# Patient Record
Sex: FEMALE | Race: WHITE | URBAN - METROPOLITAN AREA
[De-identification: names, ages, dates, MRNs, and addresses within clinical notes are randomized per-mention and may not be internally consistent; named-entity substitution may affect disease eponyms.]

---

## 2018-06-01 ENCOUNTER — TRANSFERRED RECORDS (OUTPATIENT)
Dept: HEALTH INFORMATION MANAGEMENT | Facility: CLINIC | Age: 43
End: 2018-06-01

## 2018-06-01 LAB — PAP SMEAR - HIM PATIENT REPORTED: NEGATIVE

## 2019-11-12 ENCOUNTER — OFFICE VISIT (OUTPATIENT)
Dept: FAMILY MEDICINE | Facility: CLINIC | Age: 44
End: 2019-11-12
Payer: COMMERCIAL

## 2019-11-12 VITALS
BODY MASS INDEX: 41.37 KG/M2 | OXYGEN SATURATION: 96 % | HEIGHT: 66 IN | SYSTOLIC BLOOD PRESSURE: 132 MMHG | RESPIRATION RATE: 16 BRPM | DIASTOLIC BLOOD PRESSURE: 89 MMHG | HEART RATE: 88 BPM | TEMPERATURE: 97.4 F | WEIGHT: 257.4 LBS

## 2019-11-12 DIAGNOSIS — E66.01 MORBID OBESITY (H): ICD-10-CM

## 2019-11-12 DIAGNOSIS — Z86.2 HISTORY OF ANEMIA: ICD-10-CM

## 2019-11-12 DIAGNOSIS — Z83.3 FAMILY HISTORY OF DIABETES MELLITUS: ICD-10-CM

## 2019-11-12 DIAGNOSIS — E03.9 ACQUIRED HYPOTHYROIDISM: ICD-10-CM

## 2019-11-12 DIAGNOSIS — I95.1 ORTHOSTATIC HYPOTENSION: ICD-10-CM

## 2019-11-12 DIAGNOSIS — A09 TRAVELER'S DIARRHEA: ICD-10-CM

## 2019-11-12 DIAGNOSIS — G25.81 RESTLESS LEG SYNDROME: ICD-10-CM

## 2019-11-12 DIAGNOSIS — B37.31 YEAST INFECTION OF THE VAGINA: ICD-10-CM

## 2019-11-12 DIAGNOSIS — Z00.00 ROUTINE GENERAL MEDICAL EXAMINATION AT A HEALTH CARE FACILITY: Primary | ICD-10-CM

## 2019-11-12 DIAGNOSIS — M67.432 GANGLION CYST OF WRIST, LEFT: ICD-10-CM

## 2019-11-12 LAB
CHOLEST SERPL-MCNC: 218 MG/DL
DEPRECATED CALCIDIOL+CALCIFEROL SERPL-MC: 36 UG/L (ref 20–75)
ERYTHROCYTE [DISTWIDTH] IN BLOOD BY AUTOMATED COUNT: 14.9 % (ref 10–15)
FERRITIN SERPL-MCNC: 21 NG/ML (ref 12–150)
HBA1C MFR BLD: 5.7 % (ref 0–5.6)
HCT VFR BLD AUTO: 39.2 % (ref 35–47)
HDLC SERPL-MCNC: 40 MG/DL
HGB BLD-MCNC: 12.5 G/DL (ref 11.7–15.7)
LDLC SERPL CALC-MCNC: 141 MG/DL
MCH RBC QN AUTO: 27.2 PG (ref 26.5–33)
MCHC RBC AUTO-ENTMCNC: 31.9 G/DL (ref 31.5–36.5)
MCV RBC AUTO: 85 FL (ref 78–100)
NONHDLC SERPL-MCNC: 178 MG/DL
PLATELET # BLD AUTO: 302 10E9/L (ref 150–450)
RBC # BLD AUTO: 4.59 10E12/L (ref 3.8–5.2)
TRIGL SERPL-MCNC: 183 MG/DL
TSH SERPL DL<=0.005 MIU/L-ACNC: 2.15 MU/L (ref 0.4–4)
VIT B12 SERPL-MCNC: 3854 PG/ML (ref 193–986)
WBC # BLD AUTO: 10.5 10E9/L (ref 4–11)

## 2019-11-12 PROCEDURE — 82607 VITAMIN B-12: CPT | Performed by: FAMILY MEDICINE

## 2019-11-12 PROCEDURE — 82306 VITAMIN D 25 HYDROXY: CPT | Performed by: FAMILY MEDICINE

## 2019-11-12 PROCEDURE — 80061 LIPID PANEL: CPT | Performed by: FAMILY MEDICINE

## 2019-11-12 PROCEDURE — 83036 HEMOGLOBIN GLYCOSYLATED A1C: CPT | Performed by: FAMILY MEDICINE

## 2019-11-12 PROCEDURE — 82728 ASSAY OF FERRITIN: CPT | Performed by: FAMILY MEDICINE

## 2019-11-12 PROCEDURE — 99386 PREV VISIT NEW AGE 40-64: CPT | Performed by: FAMILY MEDICINE

## 2019-11-12 PROCEDURE — 85027 COMPLETE CBC AUTOMATED: CPT | Performed by: FAMILY MEDICINE

## 2019-11-12 PROCEDURE — 99213 OFFICE O/P EST LOW 20 MIN: CPT | Mod: 25 | Performed by: FAMILY MEDICINE

## 2019-11-12 PROCEDURE — 84443 ASSAY THYROID STIM HORMONE: CPT | Performed by: FAMILY MEDICINE

## 2019-11-12 PROCEDURE — 36415 COLL VENOUS BLD VENIPUNCTURE: CPT | Performed by: FAMILY MEDICINE

## 2019-11-12 RX ORDER — CIPROFLOXACIN 500 MG/1
500 TABLET, FILM COATED ORAL 2 TIMES DAILY
Qty: 6 TABLET | Refills: 1 | Status: SHIPPED | OUTPATIENT
Start: 2019-11-12

## 2019-11-12 RX ORDER — FLUCONAZOLE 150 MG/1
150 TABLET ORAL ONCE
Qty: 1 TABLET | Refills: 3 | Status: SHIPPED | OUTPATIENT
Start: 2019-11-12 | End: 2019-11-12

## 2019-11-12 RX ORDER — LEVOTHYROXINE SODIUM 75 UG/1
75 TABLET ORAL DAILY
Qty: 30 TABLET | Refills: 3 | Status: SHIPPED | OUTPATIENT
Start: 2019-11-12 | End: 2020-03-25

## 2019-11-12 RX ORDER — LEVOTHYROXINE SODIUM 75 MCG
TABLET ORAL
Refills: 0 | COMMUNITY
Start: 2019-10-24

## 2019-11-12 ASSESSMENT — MIFFLIN-ST. JEOR: SCORE: 1834.55

## 2019-11-12 NOTE — Clinical Note
Chief Complaint   Patient presents with     RECHECK     diabetes     Naheed Celaya CMA   Pt had a normal PAP in June 2018

## 2019-11-12 NOTE — NURSING NOTE
"Chief Complaint   Patient presents with     Physical     BP (!) 133/91   Pulse 88   Temp 97.4  F (36.3  C) (Oral)   Resp 16   Ht 1.669 m (5' 5.7\")   Wt 116.8 kg (257 lb 6.4 oz)   LMP 10/21/2019 (Exact Date)   SpO2 96%   BMI 41.93 kg/m   Estimated body mass index is 41.93 kg/m  as calculated from the following:    Height as of this encounter: 1.669 m (5' 5.7\").    Weight as of this encounter: 116.8 kg (257 lb 6.4 oz).  bp completed using cuff size: large      Health Maintenance addressed:  Mammogram and Pap Smear    Pt had pap over a year ago pap normal  Mammogram due according to patient  Tahir Pending for Records.    Madalyn Gooden MA    "

## 2019-11-12 NOTE — PROGRESS NOTES
SUBJECTIVE:   CC: Anayeli Lopez is an 44 year old woman who presents for preventive health visit.     Healthy Habits:     Getting at least 3 servings of Calcium per day:  Yes    Bi-annual eye exam:  Yes    Dental care twice a year:  Yes    Sleep apnea or symptoms of sleep apnea:  None    Diet:  Regular (no restrictions)    Frequency of exercise:  None    Taking medications regularly:  Yes    Medication side effects:  None    PHQ-2 Total Score: 1    Additional concerns today:  Yes      1) hypothyroidism, she has had it fr a while and currently is o n Synthroid 75 mcg , needs to check TSH   2) RLS, at night she has restless leg , she has been diagnosed with iron deficiency anemia in the past , states that her periods now are heavy for a day may be two but not too long   3) hx of iron deficiency anemia not a vegan somewhat heavy menstrual cycles but not too long and they are q month   4) ganglion cyst on the dorsum of the left hand  She has had it surgically removed over 6 yrs ago but it regrew back , so needs ot see a hand specilialist   5) she travels internationally for work and often gets Travelers diarrhea and also yeast inf vaginal and wants me to Rx diflucan and Cipro just in case \  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 because of lots of travel , she has limited time for exercise and not able to prepare her meals often eats out and has gained about 20 lbs in the past yr , also stress of traveling and of the job demands   7) orthostatic hypotension, states taht when she gets up from sitting she feels liek she cannot see or that she gets light headed and pressure in her head - does not happen always but when is does she feels worried , she is not on a diet  She has gained some weight during the past yr   FH of breast cancer in her mom in her 60s       Today's PHQ-2 Score:   PHQ-2 ( 1999 [employer]) 11/12/2019   Q1: Little interest or pleasure in doing things 0   Q2: Feeling down,  depressed or hopeless 1   PHQ-2 Score 1   Q1: Little interest or pleasure in doing things Not at all   Q2: Feeling down, depressed or hopeless Several days   PHQ-2 Score 1       Abuse: Current or Past(Physical, Sexual or Emotional)- No  Do you feel safe in your environment? Yes        Social History     Tobacco Use     Smoking status: Not on file   Substance Use Topics     Alcohol use: Not on file     If you drink alcohol do you typically have >3 drinks per day or >7 drinks per week? No    Alcohol Use 11/12/2019   Prescreen: >3 drinks/day or >7 drinks/week? No   No flowsheet data found.    Reviewed orders with patient.  Reviewed health maintenance and updated orders accordingly - Yes  Labs reviewed in EPIC  BP Readings from Last 3 Encounters:   11/12/19 132/89    Wt Readings from Last 3 Encounters:   11/12/19 116.8 kg (257 lb 6.4 oz)                  Patient Active Problem List   Diagnosis     Morbid obesity (H)     No past surgical history on file.    Social History     Tobacco Use     Smoking status: Never Smoker     Smokeless tobacco: Never Used   Substance Use Topics     Alcohol use: Not Currently     No family history on file.      Current Outpatient Medications   Medication Sig Dispense Refill     ciprofloxacin (CIPRO) 500 MG tablet Take 1 tablet (500 mg) by mouth 2 times daily 6 tablet 1     esomeprazole (NEXIUM) 20 MG DR capsule Take 20 mg by mouth every morning (before breakfast) Take 30-60 minutes before eating. Sometimes takes 40mg       fluconazole (DIFLUCAN) 150 MG tablet Take 1 tablet (150 mg) by mouth once for 1 dose 1 tablet 3     levothyroxine (SYNTHROID/LEVOTHROID) 75 MCG tablet Take 1 tablet (75 mcg) by mouth daily 30 tablet 3     SYNTHROID 75 MCG tablet TK 1 T PO QAM  0     Allergies   Allergen Reactions     Codeine      Recent Labs   Lab Test 11/12/19  0832   A1C 5.7*        Mammogram Screening: Patient under age 50, mutual decision reflected in health maintenance.      Pertinent mammograms are  reviewed under the imaging tab.  History of abnormal Pap smear: NO - age 30- 65 PAP every 3 years recommended     Reviewed and updated as needed this visit by clinical staff  Meds         Reviewed and updated as needed this visit by Provider        No past medical history on file.   No past surgical history on file.    Review of Systems  CONSTITUTIONAL: NEGATIVE for fever, chills, change in weight  INTEGUMENTARU/SKIN: NEGATIVE for worrisome rashes, moles or lesions  EYES: NEGATIVE for vision changes or irritation  ENT: NEGATIVE for ear, mouth and throat problems  RESP: NEGATIVE for significant cough or SOB  BREAST: NEGATIVE for masses, tenderness or discharge  CV: NEGATIVE for chest pain, palpitations or peripheral edema  GI: NEGATIVE for nausea, abdominal pain, heartburn, or change in bowel habits  : NEGATIVE for unusual urinary or vaginal symptoms. Periods are regular.  MUSCULOSKELETAL: NEGATIVE for significant arthralgias or myalgia  NEURO: NEGATIVE for weakness, dizziness or paresthesias  PSYCHIATRIC: NEGATIVE for changes in mood or affect     OBJECTIVE:   There were no vitals taken for this visit.  Physical Exam  GENERAL: healthy, alert and no distress  EYES: Eyes grossly normal to inspection, PERRL and conjunctivae and sclerae normal  HENT: ear canals and TM's normal, nose and mouth without ulcers or lesions  NECK: no adenopathy, no asymmetry, masses, or scars and thyroid normal to palpation  RESP: lungs clear to auscultation - no rales, rhonchi or wheezes  BREAST: normal without masses, tenderness or nipple discharge and no palpable axillary masses or adenopathy  CV: regular rate and rhythm, normal S1 S2, no S3 or S4, no murmur, click or rub, no peripheral edema and peripheral pulses strong  ABDOMEN: soft, nontender, no hepatosplenomegaly, no masses and bowel sounds normal  MS: no gross musculoskeletal defects noted, no edema EXCEPT there is a ganglion cyst about 3 cm in diameter on the dorsum of the left  hand  No erythema   SKIN: no suspicious lesions or rashes  NEURO: Normal strength and tone, mentation intact and speech normal  PSYCH: mentation appears normal, affect normal/bright    Diagnostic Test Results:  Labs reviewed in Epic  Results for orders placed or performed in visit on 11/12/19 (from the past 24 hour(s))   Hemoglobin A1c   Result Value Ref Range    Hemoglobin A1C 5.7 (H) 0 - 5.6 %   CBC with platelets   Result Value Ref Range    WBC 10.5 4.0 - 11.0 10e9/L    RBC Count 4.59 3.8 - 5.2 10e12/L    Hemoglobin 12.5 11.7 - 15.7 g/dL    Hematocrit 39.2 35.0 - 47.0 %    MCV 85 78 - 100 fl    MCH 27.2 26.5 - 33.0 pg    MCHC 31.9 31.5 - 36.5 g/dL    RDW 14.9 10.0 - 15.0 %    Platelet Count 302 150 - 450 10e9/L       ASSESSMENT/PLAN:   1. Routine general medical examination at a health care facility  Discussed diet,calcium,exercise.Went over self breast exam.Thin prep was NOT done.Eyes and teeth UTD.No immunizations needed today.See orders below for tests ordered and screening needed.    - Vitamin D Deficiency  - Vitamin B12  - TSH with free T4 reflex  - Ferritin  - Lipid Profile (Chol, Trig, HDL, LDL calc)    2. Acquired hypothyroidism  Will check TSH today and I have sent a refill for the synthroid   - levothyroxine (SYNTHROID/LEVOTHROID) 75 MCG tablet; Take 1 tablet (75 mcg) by mouth daily  Dispense: 30 tablet; Refill: 3    3. Morbid obesity (H)  Discussed healthy diet and exercise      4. Orthostatic hypotension  We discussed after the labs work up if still with these symtpoms to see cardiology  She will make sure she drinks enough fluids, does not skip meals , eats protein and has fiber in her meals, will check CBC as she has had anemia in the past - referred to cardiology   - CARDIOLOGY EVAL ADULT REFERRAL    5. History of anemia  As above   - CBC with platelets    6. Restless leg syndrome  Could be related to low iron levels  So would need to take iron daily     7. Ganglion cyst of wrist, left  Will see  hand specialist and I have given her a referral   - ORTHOPEDICS ADULT REFERRAL    8. Family history of diabetes mellitus  Will check hgb A 1 c   - Hemoglobin A1c    9. Traveler's diarrhea  Refilled for when she travels internqationally   - ciprofloxacin (CIPRO) 500 MG tablet; Take 1 tablet (500 mg) by mouth 2 times daily  Dispense: 6 tablet; Refill: 1    10. Yeast infection of the vagina  In the future for when she is out of the country to take when wth yeast infection.  - fluconazole (DIFLUCAN) 150 MG tablet; Take 1 tablet (150 mg) by mouth once for 1 dose  Dispense: 1 tablet; Refill: 3    COUNSELING:  Reviewed preventive health counseling, as reflected in patient instructions       Regular exercise       Healthy diet/nutrition       Osteoporosis Prevention/Bone Health       (Myranda)menopause management    There is no height or weight on file to calculate BMI.    Weight management plan: Discussed healthy diet and exercise guidelines     has no history on file for tobacco.      Counseling Resources:  ATP IV Guidelines  Pooled Cohorts Equation Calculator  Breast Cancer Risk Calculator  FRAX Risk Assessment  ICSI Preventive Guidelines  Dietary Guidelines for Americans, 2010  USDA's MyPlate  ASA Prophylaxis  Lung CA Screening    Belen Goodman MD  [facility]

## 2019-11-12 NOTE — PATIENT INSTRUCTIONS
Preventive Health Recommendations  Female Ages 40 to 49    Yearly exam:     See your health care provider every year in order to  1. Review health changes.   2. Discuss preventive care.    3. Review your medicines if your doctor prescribed any.      Get a Pap test every three years (unless you have an abnormal result and your provider advises testing more often).      If you get Pap tests with HPV test, you only need to test every 5 years, unless you have an abnormal result. You do not need a Pap test if your uterus was removed (hysterectomy) and you have not had cancer.      You should be tested each year for STDs (sexually transmitted diseases), if you're at risk.     Ask your doctor if you should have a mammogram.      Have a colonoscopy (test for colon cancer) if someone in your family has had colon cancer or polyps before age 50.       Have a cholesterol test every 5 years.       Have a diabetes test (fasting glucose) after age 45. If you are at risk for diabetes, you should have this test every 3 years.    Shots: Get a flu shot each year. Get a tetanus shot every 10 years.     Nutrition:     Eat at least 5 servings of fruits and vegetables each day.    Eat whole-grain bread, whole-wheat pasta and brown rice instead of white grains and rice.    Get adequate Calcium and Vitamin D.      Lifestyle    Exercise at least 150 minutes a week (an average of 30 minutes a day, 5 days a week). This will help you control your weight and prevent disease.    Limit alcohol to one drink per day.    No smoking.     Wear sunscreen to prevent skin cancer.    See your dentist every six months for an exam and cleaning.  PLEASE CALL TO SCHEDULE YOUR MAMMOGRAM  [facility] [phone]  [facility] [place] [phone]  [facility]   [phone]  Central Scheduling (all locations) [phone]    If you are under/uninsured, we recommend you contact the [facility]. They offer mammograms on a  sliding fee charge. You can schedule with them at [phone].

## 2019-11-14 ENCOUNTER — NURSE TRIAGE (OUTPATIENT)
Dept: NURSING | Facility: CLINIC | Age: 44
End: 2019-11-14

## 2019-11-14 ENCOUNTER — TELEPHONE (OUTPATIENT)
Dept: FAMILY MEDICINE | Facility: CLINIC | Age: 44
End: 2019-11-14

## 2019-11-14 DIAGNOSIS — Z86.2 HISTORY OF ANEMIA: Primary | ICD-10-CM

## 2019-11-14 NOTE — TELEPHONE ENCOUNTER
Clinic Action Needed:  Yes, callback  [facility] Triage Call  Presenting Problem:    Anayeli is calling and would like to speak with MD Goodman regarding her recent lab results.  Anayeli viewed her results on [employer].  Please phone Anayeli at [phone].  Anayeli has questions on her B12 results.    Routed to:  RN Pool    Please be sure to close this encounter once this patient's issue/question has been addressed.    Shanae Barkley RN/[facility]

## 2019-11-14 NOTE — TELEPHONE ENCOUNTER
Anayeli is calling and would like to speak with MD Goodman regarding her recent lab results.  Anayeli viewed her results on [employer].  Please phone Anayeli at [phone].  Anayeli has questions on her B12 results.

## 2019-11-14 NOTE — TELEPHONE ENCOUNTER
Reviewed notes from LS, also mentioned that note was sent in [employer].    Pt agrees with plan.  Ordered B12 pt may want to recheck in a month.

## 2019-11-14 NOTE — TELEPHONE ENCOUNTER
b12 levels are high- I recommend stopping her supplement and if not taking supplement should make an appointment to discuss   Can you let her know this ?  I also just sent the lab results with my comments through [employer]   Thanks

## 2019-12-23 NOTE — TELEPHONE ENCOUNTER
RECORDS RECEIVED FROM: Internal   DATE RECEIVED: 1-20-20   NOTES STATUS DETAILS   OFFICE NOTE from referring provider    Internal 11-12 Dr. Goodman   OFFICE NOTE from other cardiologists   and neurologists N/A    DISCHARGE SUMMARY from hospital    N/A    DISCHARGE REPORT from the ER   N/A    OPERATIVE REPORT    N/A    MEDICATION LIST   Internal    LABS     BMP   N/A    CBC   Internal 11-12-19   CMP   N/A    Lipids   Internal 11-12-19   TSH   Internal 11-12-19   DIAGNOSTIC PROCEDURES     EKG  Strips *important N/A    Monitor Reports  Strips *important N/A    Cardioversions   N/A    ICD/pacemaker implant       Tilt table studies   N/A    IMAGING (DISC & REPORT)      ECHO's   N/A    Stress Tests   N/A    Cath   N/A    CT/CTA   N/A    MRI/MRA   N/A      Action    Action Taken 12-23: called pt and lvm asking if she's previously seen a cardiologist or follows with a PCP regularly  12-23: pt called back. Said she's never seen a cardiologist for her dx and that her former PCP she only saw once and declined to share the name. No records.

## 2020-01-07 ENCOUNTER — ANCILLARY PROCEDURE (OUTPATIENT)
Dept: MAMMOGRAPHY | Facility: CLINIC | Age: 45
End: 2020-01-07
Attending: FAMILY MEDICINE
Payer: COMMERCIAL

## 2020-01-07 ENCOUNTER — DOCUMENTATION ONLY (OUTPATIENT)
Dept: CARE COORDINATION | Facility: CLINIC | Age: 45
End: 2020-01-07

## 2020-01-07 DIAGNOSIS — Z12.31 VISIT FOR SCREENING MAMMOGRAM: ICD-10-CM

## 2020-01-17 ENCOUNTER — TRANSFERRED RECORDS (OUTPATIENT)
Dept: HEALTH INFORMATION MANAGEMENT | Facility: CLINIC | Age: 45
End: 2020-01-17

## 2020-01-20 ENCOUNTER — PRE VISIT (OUTPATIENT)
Dept: CARDIOLOGY | Facility: CLINIC | Age: 45
End: 2020-01-20

## 2020-01-20 ENCOUNTER — OFFICE VISIT (OUTPATIENT)
Dept: CARDIOLOGY | Facility: CLINIC | Age: 45
End: 2020-01-20
Attending: FAMILY MEDICINE
Payer: COMMERCIAL

## 2020-01-20 VITALS — BODY MASS INDEX: 42.11 KG/M2 | WEIGHT: 262 LBS | OXYGEN SATURATION: 96 % | HEIGHT: 66 IN

## 2020-01-20 DIAGNOSIS — R42 ORTHOSTATIC LIGHTHEADEDNESS: ICD-10-CM

## 2020-01-20 PROCEDURE — 99204 OFFICE O/P NEW MOD 45 MIN: CPT | Mod: ZP | Performed by: INTERNAL MEDICINE

## 2020-01-20 PROCEDURE — G0463 HOSPITAL OUTPT CLINIC VISIT: HCPCS | Mod: 25,ZF

## 2020-01-20 ASSESSMENT — PAIN SCALES - GENERAL: PAINLEVEL: NO PAIN (0)

## 2020-01-20 ASSESSMENT — MIFFLIN-ST. JEOR: SCORE: 1860.17

## 2020-01-20 NOTE — LETTER
"1/20/2020      RE: Anayeli Lopez  [address]  [place] [address]       Dear Colleague,    Thank you for the opportunity to participate in the care of your patient, Anayeli Lopez, at the [facility]. Please see a copy of my visit note below.    Clinical Cardiac Electrophysiology    Chief Complaint: orthostatic symptoms    HPI: I was happy to see Ms. Lopez for the above.    She reports that on occasion when she rises from standing her vision \"blurs\", \"see double\". She has not had syncope. She usually waits and it passes after a few minutes. She has had to sit back down at times.    Her second complaint, which is much more common (almost persistent). She reports it feels like her \"brain is shaking\" at the back of her head. At times this is so intense she actually grabs her head in her hands. This is not associated with position changes.     Current Outpatient Medications   Medication Sig Dispense Refill     esomeprazole (NEXIUM) 20 MG DR capsule Take 20 mg by mouth every morning (before breakfast) Take 30-60 minutes before eating. Sometimes takes 40mg       levothyroxine (SYNTHROID/LEVOTHROID) 75 MCG tablet Take 1 tablet (75 mcg) by mouth daily 30 tablet 3     SYNTHROID 75 MCG tablet TK 1 T PO QAM  0     ciprofloxacin (CIPRO) 500 MG tablet Take 1 tablet (500 mg) by mouth 2 times daily (Patient not taking: Reported on 1/20/2020) 6 tablet 1       Past Medical History:   Diagnosis Date     Hypothyroidism      Iron deficiency anemia      RLS (restless legs syndrome)        No past surgical history on file.    Family History   Problem Relation Age of Onset     Breast Cancer Mother 57       Social History     Tobacco Use     Smoking status: Never Smoker     Smokeless tobacco: Never Used   Substance Use Topics     Alcohol use: Not Currently       Allergies   Allergen Reactions     Codeine          ROS:   CONSTITUTIONAL:No report of fever, " "chills,  or change in weight  RESPIRATORY: No cough, wheezing, SOB, or hemoptysis  CARDIOVASCULAR: see HPI  MUSCULO-SKELETAL: No joint pain/swelling, no muslce pain  NEURO: No paresthesias, syncope, pre-syncope, light headness, dizziness or vertigo  ENDOCRINE: No temperature intolerance, no skin/hair changes  PSYCHIATRIC: No change in mood, feeling down/anxious, no change in sleep or appetite  GI: no melena or hematochezia, no change in bowel habits  : no hematuria or dysurea, no hesitancy, dribbling or incontinence  HEME: no easy bruising or bleeding, no history of anemia, no history of blood clots  SKIN: no rashes or sores, no unusual itching        Physical Examination:  Vitals: Ht 1.676 m (5' 6\")   Wt 118.8 kg (262 lb)   SpO2 96%   BMI 42.29 kg/m     BMI= Body mass index is 42.29 kg/m .   Orthostatic Vitals from 01/18/20 1618 to 01/20/20 1618    Date and Time Orthostatic BP Orthostatic Pulse Patient Position BP   Location Cuff Size   01/20/20 1023 136/95 standing 5 min 101 Standing Right arm Adult Large   01/20/20 1022 141/91 standing 1 min 100 Standing Right arm Adult Large   01/20/20 1021 136/91 96 Standing Right arm Adult Large   01/20/20 1015 130/85 88 Supine Right arm Adult Large      GENERAL APPEARANCE: healthy, alert and no distress  HEENT: no icterus, no xanthelasmas, normal pupil size, mucosa moist, no cyanosis.  NECK: carotid upstrokes are normal bilaterally, no cervical bruits, JVP is not visible  CHEST: lungs clear to auscultation, respirations are unlabored, normal respiratory rate  CARDIOVASCULAR: regular rhythm, normal S1S2, no S3 or S4 and no murmur, click or rub, precordium quiet   ABDOMEN: soft, non tender, without hepatosplenomegaly, no masses palpable, bowel sounds normal, aorta not enlarged by palpation, no abdominal bruits  EXTREMITIES: no clubbing, cyanosis or edema  NEURO: alert and oriented to person/place/time, normal speech, gait and affect  VASC: Radial and pedal pulses are " "palpable. No vascular bruits heard.  SKIN: no ecchymoses, no rashes    Laboratory and diagnostic data reviewed January 20, 2020:    Results for RONALD BANDA (MRN [id number]) as of 1/20/2020 10:11   Ref. Range 11/12/2019 08:32   TSH Latest Ref Range: 0.40 - 4.00 mU/L 2.15     Results for RONALD BANDA (MRN [id number]) as of 1/20/2020 10:11   Ref. Range 11/12/2019 08:32   Hemoglobin Latest Ref Range: 11.7 - 15.7 g/dL 12.5   Hematocrit Latest Ref Range: 35.0 - 47.0 % 39.2       Assessment and recommendations:    1) Orthostatic change in vision - she did not show evidence of orthostatic hypotension today during her 5 minute active standing test.     We discussed head-up tilt table study + autonomic testing. We reviewed that if that study demonstrated evidence of orthostatic hypotension or vaso-vagal near syncope the treatment recommendations would not change, namely adequate hydration. She eats a fairly typical American diet, so I doubt that salt is an issue but I have encouraged her to drink 1.5-2.0 liters of water a day.    Her CV exam is normal. She had an ECG a year ago she reports as normal. We've requested a copy of that tracing.     2) \"Brain shaking\" - she acknowledge's that this sounds \"crazy\" but this is the best she can describe what she's feeling. I'm not sure if she is describing something akin to \"brain fog\" but we discussed that finding an etiology for that can be very challenging. This symptom is not related to position change and is not likely be related to orthostatic hypotension (assuming she even has that).     She is considering seeing a neurologist but is hesitant due to out of pocket expenses.    I appreciate the chance to help with 's care. Please let me know if you have any questions or concerns.    Portions of this note were dictated using speech recognition software. The note has been proofread but errors in the text may have been overlooked. Please contact me if there " are any concerns regarding the accuracy of the dictation.       Please do not hesitate to contact me if you have any questions/concerns.     Sincerely,     Jim Silverio MD

## 2020-01-20 NOTE — PROGRESS NOTES
"      Clinical Cardiac Electrophysiology    Chief Complaint: orthostatic symptoms    HPI: I was happy to see Ms. Lopez for the above.    She reports that on occasion when she rises from standing her vision \"blurs\", \"see double\". She has not had syncope. She usually waits and it passes after a few minutes. She has had to sit back down at times.    Her second complaint, which is much more common (almost persistent). She reports it feels like her \"brain is shaking\" at the back of her head. At times this is so intense she actually grabs her head in her hands. This is not associated with position changes.     Current Outpatient Medications   Medication Sig Dispense Refill     esomeprazole (NEXIUM) 20 MG DR capsule Take 20 mg by mouth every morning (before breakfast) Take 30-60 minutes before eating. Sometimes takes 40mg       levothyroxine (SYNTHROID/LEVOTHROID) 75 MCG tablet Take 1 tablet (75 mcg) by mouth daily 30 tablet 3     SYNTHROID 75 MCG tablet TK 1 T PO QAM  0     ciprofloxacin (CIPRO) 500 MG tablet Take 1 tablet (500 mg) by mouth 2 times daily (Patient not taking: Reported on 1/20/2020) 6 tablet 1       Past Medical History:   Diagnosis Date     Hypothyroidism      Iron deficiency anemia      RLS (restless legs syndrome)        No past surgical history on file.    Family History   Problem Relation Age of Onset     Breast Cancer Mother 57       Social History     Tobacco Use     Smoking status: Never Smoker     Smokeless tobacco: Never Used   Substance Use Topics     Alcohol use: Not Currently       Allergies   Allergen Reactions     Codeine          ROS:   CONSTITUTIONAL:No report of fever, chills,  or change in weight  RESPIRATORY: No cough, wheezing, SOB, or hemoptysis  CARDIOVASCULAR: see HPI  MUSCULO-SKELETAL: No joint pain/swelling, no muslce pain  NEURO: No paresthesias, syncope, pre-syncope, light headness, dizziness or vertigo  ENDOCRINE: No temperature intolerance, no skin/hair " "changes  PSYCHIATRIC: No change in mood, feeling down/anxious, no change in sleep or appetite  GI: no melena or hematochezia, no change in bowel habits  : no hematuria or dysurea, no hesitancy, dribbling or incontinence  HEME: no easy bruising or bleeding, no history of anemia, no history of blood clots  SKIN: no rashes or sores, no unusual itching        Physical Examination:  Vitals: Ht 1.676 m (5' 6\")   Wt 118.8 kg (262 lb)   SpO2 96%   BMI 42.29 kg/m    BMI= Body mass index is 42.29 kg/m .   Orthostatic Vitals from 01/18/20 1618 to 01/20/20 1618    Date and Time Orthostatic BP Orthostatic Pulse Patient Position BP   Location Cuff Size   01/20/20 1023 136/95 standing 5 min 101 Standing Right arm Adult Large   01/20/20 1022 141/91 standing 1 min 100 Standing Right arm Adult Large   01/20/20 1021 136/91 96 Standing Right arm Adult Large   01/20/20 1015 130/85 88 Supine Right arm Adult Large      GENERAL APPEARANCE: healthy, alert and no distress  HEENT: no icterus, no xanthelasmas, normal pupil size, mucosa moist, no cyanosis.  NECK: carotid upstrokes are normal bilaterally, no cervical bruits, JVP is not visible  CHEST: lungs clear to auscultation, respirations are unlabored, normal respiratory rate  CARDIOVASCULAR: regular rhythm, normal S1S2, no S3 or S4 and no murmur, click or rub, precordium quiet   ABDOMEN: soft, non tender, without hepatosplenomegaly, no masses palpable, bowel sounds normal, aorta not enlarged by palpation, no abdominal bruits  EXTREMITIES: no clubbing, cyanosis or edema  NEURO: alert and oriented to person/place/time, normal speech, gait and affect  VASC: Radial and pedal pulses are palpable. No vascular bruits heard.  SKIN: no ecchymoses, no rashes    Laboratory and diagnostic data reviewed January 20, 2020:    Results for SOLO RONALD (MRN [id number]) as of 1/20/2020 10:11   Ref. Range 11/12/2019 08:32   TSH Latest Ref Range: 0.40 - 4.00 mU/L 2.15     Results for SOLO" "RONALD (MRN [id number]) as of 1/20/2020 10:11   Ref. Range 11/12/2019 08:32   Hemoglobin Latest Ref Range: 11.7 - 15.7 g/dL 12.5   Hematocrit Latest Ref Range: 35.0 - 47.0 % 39.2       Assessment and recommendations:    1) Orthostatic change in vision - she did not show evidence of orthostatic hypotension today during her 5 minute active standing test.     We discussed head-up tilt table study + autonomic testing. We reviewed that if that study demonstrated evidence of orthostatic hypotension or vaso-vagal near syncope the treatment recommendations would not change, namely adequate hydration. She eats a fairly typical American diet, so I doubt that salt is an issue but I have encouraged her to drink 1.5-2.0 liters of water a day.    Her CV exam is normal. She had an ECG a year ago she reports as normal. We've requested a copy of that tracing.     2) \"Brain shaking\" - she acknowledge's that this sounds \"crazy\" but this is the best she can describe what she's feeling. I'm not sure if she is describing something akin to \"brain fog\" but we discussed that finding an etiology for that can be very challenging. This symptom is not related to position change and is not likely be related to orthostatic hypotension (assuming she even has that).     She is considering seeing a neurologist but is hesitant due to out of pocket expenses.    I appreciate the chance to help with 's care. Please let me know if you have any questions or concerns.    Portions of this note were dictated using speech recognition software. The note has been proofread but errors in the text may have been overlooked. Please contact me if there are any concerns regarding the accuracy of the dictation.     "

## 2020-01-20 NOTE — NURSING NOTE
Chief Complaint   Patient presents with     New Patient     44yoF w/ sx of [place], referred by PCP Dr. Goodman, presents for EP consult     Vitals were taken and medications were reconciled.     Anita Olson RMA  10:27 AM'

## 2020-01-23 ENCOUNTER — TRANSFERRED RECORDS (OUTPATIENT)
Dept: HEALTH INFORMATION MANAGEMENT | Facility: CLINIC | Age: 45
End: 2020-01-23

## 2020-01-28 ENCOUNTER — OFFICE VISIT (OUTPATIENT)
Dept: FAMILY MEDICINE | Facility: CLINIC | Age: 45
End: 2020-01-28
Payer: COMMERCIAL

## 2020-01-28 VITALS
HEART RATE: 95 BPM | TEMPERATURE: 98.5 F | DIASTOLIC BLOOD PRESSURE: 78 MMHG | SYSTOLIC BLOOD PRESSURE: 127 MMHG | RESPIRATION RATE: 16 BRPM | OXYGEN SATURATION: 100 % | HEIGHT: 66 IN | BODY MASS INDEX: 41.93 KG/M2 | WEIGHT: 260.9 LBS

## 2020-01-28 DIAGNOSIS — E66.01 MORBID OBESITY (H): ICD-10-CM

## 2020-01-28 DIAGNOSIS — M67.432 GANGLION CYST OF WRIST, LEFT: ICD-10-CM

## 2020-01-28 DIAGNOSIS — G47.25 JET LAG: ICD-10-CM

## 2020-01-28 DIAGNOSIS — Z01.818 PREOP GENERAL PHYSICAL EXAM: Primary | ICD-10-CM

## 2020-01-28 PROCEDURE — 99214 OFFICE O/P EST MOD 30 MIN: CPT | Performed by: PHYSICIAN ASSISTANT

## 2020-01-28 RX ORDER — ZOLPIDEM TARTRATE 5 MG/1
5 TABLET ORAL
Qty: 30 TABLET | Refills: 0 | Status: SHIPPED | OUTPATIENT
Start: 2020-01-28

## 2020-01-28 ASSESSMENT — MIFFLIN-ST. JEOR: SCORE: 1855.18

## 2020-01-28 NOTE — PROGRESS NOTES
"[facility]  [address]  [facility] [address] [phone]  Dept: [phone]    PRE-OP EVALUATION:  Today's date: 2020    Anayeli Lopez (: 1975) presents for pre-operative evaluation assessment as requested by  ***.  She requires evaluation and anesthesia risk assessment prior to undergoing surgery/procedure for treatment of *** .    Fax number for surgical facility: ***  Primary Physician: Belen Goodman  Type of Anesthesia Anticipated: {ANESTHESIA:923772}    Patient has a Health Care Directive or Living Will:  {YES/NO:097995::\"NO\"}    No flowsheet data found.    HPI:     HPI related to upcoming procedure: ***      {Ch. Problems:188842}    MEDICAL HISTORY:     Patient Active Problem List    Diagnosis Date Noted     Morbid obesity (H) 2019     Priority: Medium      Past Medical History:   Diagnosis Date     Hypothyroidism      Iron deficiency anemia      RLS (restless legs syndrome)      No past surgical history on file.  Current Outpatient Medications   Medication Sig Dispense Refill     ciprofloxacin (CIPRO) 500 MG tablet Take 1 tablet (500 mg) by mouth 2 times daily (Patient not taking: Reported on 2020) 6 tablet 1     esomeprazole (NEXIUM) 20 MG DR capsule Take 20 mg by mouth every morning (before breakfast) Take 30-60 minutes before eating. Sometimes takes 40mg       levothyroxine (SYNTHROID/LEVOTHROID) 75 MCG tablet Take 1 tablet (75 mcg) by mouth daily 30 tablet 3     SYNTHROID 75 MCG tablet TK 1 T PO QAM  0     OTC products: {OTC ANALGESICS:532536}    Allergies   Allergen Reactions     Codeine       Latex Allergy: {YES/NO WITH DEFAULT:438744::\"NO\"}    Social History     Tobacco Use     Smoking status: Never Smoker     Smokeless tobacco: Never Used   Substance Use Topics     Alcohol use: Not Currently     History   Drug Use Unknown       REVIEW OF SYSTEMS:   {ROS Preop Choices:817931}    EXAM:   There were no vitals taken for this visit.  {EXAM " "Preop Choices:204366}    DIAGNOSTICS:   {DIAGNOSTIC FOR PREOP:831154}    Recent Labs   Lab Test 11/12/19  0832   HGB 12.5      A1C 5.7*        IMPRESSION:   {PREOP REASONS:052782::\"Reason for surgery/procedure: ***\",\"Diagnosis/reason for consult: ***\"}    The proposed surgical procedure is considered {HIGH=major cardiovascular or procedures requiring prolonged anesthesia >4 hours or large fluid shifts;    INTERMEDIATE=abdominal, most orthopedic and intrathoracic surgery; LOW= endoscopy, cataract and breast surgery:153224} risk.    REVISED CARDIAC RISK INDEX  The patient has the following serious cardiovascular risks for perioperative complications such as (MI, PE, VFib and 3  AV Block):  {PREOP REVISED CARDIAC INDEX (RCI):667911:p:\"No serious cardiac risks\"}  INTERPRETATION: {REVISED CARDIAC RISK INTERPRETATION:104307}    The patient has the following additional risks for perioperative complications:  {Additional perioperative risks:832828:p:\"No identified additional risks\"}      ICD-10-CM    1. Preop general physical exam Z01.818        RECOMMENDATIONS:     {IMPORTANT - Conditions - complete carefully!!:677603}    {IMPORTANT - Medications:610376::\"--Patient is to take all scheduled medications on the day of surgery EXCEPT for modifications listed below.\"}    {IMPORTANT - Approval:531849:p:\"APPROVAL GIVEN to proceed with proposed procedure, without further diagnostic evaluation\"}       Signed Electronically by: Phong Cervantes PA-C    Copy of this evaluation report is provided to requesting physician.    [facility] Preop Guidelines    Revised Cardiac Risk Index  "

## 2020-01-29 NOTE — PROGRESS NOTES
[facility]  [address]  [facility] [address] [phone]  Dept: [phone]    PRE-OP EVALUATION:  Today's date: 2020    Anayeli Lopez (: 1975) presents for pre-operative evaluation assessment as requested by Dr. Christian.  She requires evaluation and anesthesia risk assessment prior to undergoing surgery/procedure for treatment of Ganglion cyst removal of left hand .    Fax number for surgical facility: [facility] [phone]  Primary Physician: Belen Goodman  Type of Anesthesia Anticipated: to be determined    Patient has a Health Care Directive or Living Will:  YES     Preop Questions 2020   Who is doing your surgery? Dr. Christian   What are you having done? Ganglion cyst removal   Date of Surgery/Procedure:    Facility or Hospital where procedure/surgery will be performed: na   1.  Do you have a history of Heart attack, stroke, stent, coronary bypass surgery, or other heart surgery? No   2.  Do you ever have any pain or discomfort in your chest? No   3.  Do you have a history of  Heart Failure? No   4.   Are you troubled by shortness of breath when:  walking on a level surface, or up a slight hill, or at night? No   5.  Do you currently have a cold, bronchitis or other respiratory infection? No   6.  Do you have a cough, shortness of breath, or wheezing? No   7.  Do you sometimes get pains in the calves of your legs when you walk? No   8. Do you or anyone in your family have previous history of blood clots? No   9.  Do you or does anyone in your family have a serious bleeding problem such as prolonged bleeding following surgeries or cuts? No   10. Have you ever had problems with anemia or been told to take iron pills? No   11. Have you had any abnormal blood loss such as black, tarry or bloody stools, or abnormal vaginal bleeding? No   12. Have you ever had a blood transfusion? No   13. Have you or any of your relatives ever had problems  with anesthesia? No   14. Do you have sleep apnea, excessive snoring or daytime drowsiness? No   15. Do you have any prosthetic heart valves? No   16. Do you have prosthetic joints? No   17. Is there any chance that you may be pregnant? No         HPI:     HPI related to upcoming procedure: 43 y/o new to me female here for pre-op exam.  She did have her well exam back in Nov with labs, everything looked good.  She has had ganglion cyst removed about 15 years ago, recently came back in the last year.    She did have recent evaluation from cardiology, no concerns.    She does do international travel for work, and request ambien to help with jet lag.  She has taken in the past, previous provider had filled.    Patient has had surgery in past and has never had any issues with anaesthesia, bleeding or blood clots.       HYPOTHYROIDISM - Patient has a longstanding history of chronic Hypothyroidism. Patient has been doing well, noting no tremor, insomnia, hair loss or changes in skin texture. Continues to take medications as directed, without adverse reactions or side effects. Last TSH   Lab Results   Component Value Date    TSH 2.15 11/12/2019   .        MEDICAL HISTORY:     Patient Active Problem List    Diagnosis Date Noted     Morbid obesity (H) 11/12/2019     Priority: Medium      Past Medical History:   Diagnosis Date     Hypothyroidism      Iron deficiency anemia      RLS (restless legs syndrome)      No past surgical history on file.  Current Outpatient Medications   Medication Sig Dispense Refill     zolpidem (AMBIEN) 5 MG tablet Take 1 tablet (5 mg) by mouth nightly as needed for sleep 30 tablet 0     ciprofloxacin (CIPRO) 500 MG tablet Take 1 tablet (500 mg) by mouth 2 times daily (Patient not taking: Reported on 1/20/2020) 6 tablet 1     esomeprazole (NEXIUM) 20 MG DR capsule Take 20 mg by mouth every morning (before breakfast) Take 30-60 minutes before eating. Sometimes takes 40mg       levothyroxine  "(SYNTHROID/LEVOTHROID) 75 MCG tablet Take 1 tablet (75 mcg) by mouth daily 30 tablet 3     SYNTHROID 75 MCG tablet TK 1 T PO QAM  0     OTC products: None, except as noted above    Allergies   Allergen Reactions     Codeine       Latex Allergy: NO    Social History     Tobacco Use     Smoking status: Never Smoker     Smokeless tobacco: Never Used   Substance Use Topics     Alcohol use: Not Currently     History   Drug Use Unknown       REVIEW OF SYSTEMS:   CONSTITUTIONAL: NEGATIVE for fever, chills, change in weight  INTEGUMENTARY/SKIN: NEGATIVE for worrisome rashes, moles or lesions  EYES: NEGATIVE for vision changes or irritation  ENT/MOUTH: NEGATIVE for ear, mouth and throat problems  RESP: NEGATIVE for significant cough or SOB  BREAST: NEGATIVE for masses, tenderness or discharge  CV: NEGATIVE for chest pain, palpitations or peripheral edema  GI: NEGATIVE for nausea, abdominal pain, heartburn, or change in bowel habits  : NEGATIVE for frequency, dysuria, or hematuria  MUSCULOSKELETAL: NEGATIVE for significant arthralgias or myalgia  NEURO: NEGATIVE for weakness, dizziness or paresthesias  ENDOCRINE: NEGATIVE for temperature intolerance, skin/hair changes  HEME: NEGATIVE for bleeding problems  PSYCHIATRIC: NEGATIVE for changes in mood or affect    EXAM:   /78   Pulse 95   Temp 98.5  F (36.9  C) (Oral)   Resp 16   Ht 1.676 m (5' 6\")   Wt 118.3 kg (260 lb 14.4 oz)   SpO2 100%   BMI 42.11 kg/m      GENERAL APPEARANCE: alert and active     EYES: EOMI, PERRL     HENT: ear canals and TM's normal and nose and mouth without ulcers or lesions     NECK: no adenopathy, no asymmetry, masses, or scars and thyroid normal to palpation     RESP: lungs clear to auscultation - no rales, rhonchi or wheezes     CV: regular rates and rhythm, normal S1 S2, no S3 or S4 and no murmur, click or rub     MS: palpable nodule on dorsal surface of left wrist.     SKIN: no suspicious lesions or rashes     NEURO: Normal strength " and tone, sensory exam grossly normal, mentation intact and speech normal     PSYCH: mentation appears normal. and affect normal/bright    DIAGNOSTICS:   EKG: Not indicated due to non-vascular surgery and low risk of event (age <65 and without cardiac risk factors)    Recent Labs   Lab Test 11/12/19  0832   HGB 12.5      A1C 5.7*        IMPRESSION:   Reason for surgery/procedure: ganglion cyst   Diagnosis/reason for consult: as above    The proposed surgical procedure is considered INTERMEDIATE risk.    REVISED CARDIAC RISK INDEX  The patient has the following serious cardiovascular risks for perioperative complications such as (MI, PE, VFib and 3  AV Block):  No serious cardiac risks  INTERPRETATION: 0 risks: Class I (very low risk - 0.4% complication rate)    The patient has the following additional risks for perioperative complications:  No identified additional risks  Morbid obesity      ICD-10-CM    1. Preop general physical exam Z01.818    2. Ganglion cyst of wrist, left M67.432    3. Jet lag G47.25 zolpidem (AMBIEN) 5 MG tablet       RECOMMENDATIONS:       Cardiovascular Risk  Performs 4 METs exercise without symptoms (Climb a flight of stairs) .       --Patient is to take all scheduled medications on the day of surgery    APPROVAL GIVEN to proceed with proposed procedure, without further diagnostic evaluation       Signed Electronically by: Phong Cervantes PA-C    Copy of this evaluation report is provided to requesting physician.    [place] Preop Guidelines    Revised Cardiac Risk Index

## 2020-01-29 NOTE — NURSING NOTE
"Chief Complaint   Patient presents with     Pre-Op Exam     /78   Pulse 95   Temp 98.5  F (36.9  C) (Oral)   Resp 16   Ht 1.676 m (5' 6\")   Wt 118.3 kg (260 lb 14.4 oz)   SpO2 100%   BMI 42.11 kg/m   Estimated body mass index is 42.11 kg/m  as calculated from the following:    Height as of this encounter: 1.676 m (5' 6\").    Weight as of this encounter: 118.3 kg (260 lb 14.4 oz).  Medication Reconciliation: complete        Health Maintenance Due Pending Provider Review:  NONE    n/a    Marta Nielson MA  [facility]  [phone]  "

## 2020-02-14 ENCOUNTER — TRANSFERRED RECORDS (OUTPATIENT)
Dept: HEALTH INFORMATION MANAGEMENT | Facility: CLINIC | Age: 45
End: 2020-02-14

## 2020-03-25 ENCOUNTER — MYC REFILL (OUTPATIENT)
Dept: FAMILY MEDICINE | Facility: CLINIC | Age: 45
End: 2020-03-25

## 2020-03-25 DIAGNOSIS — E03.9 ACQUIRED HYPOTHYROIDISM: ICD-10-CM

## 2020-03-26 RX ORDER — LEVOTHYROXINE SODIUM 75 UG/1
75 TABLET ORAL DAILY
Qty: 90 TABLET | Refills: 1 | Status: SHIPPED | OUTPATIENT
Start: 2020-03-26 | End: 2020-05-15

## 2020-03-26 NOTE — TELEPHONE ENCOUNTER
"Prescription approved per [facility] Refill Protocol.  Aubree DEL REAL RN    Last Written Prescription Date:  11/12/20109  Last Fill Quantity: 30,  # refills: 3   Last office visit: 1/28/2020 with prescribing provider:     Future Office Visit:    Requested Prescriptions   Pending Prescriptions Disp Refills     levothyroxine (SYNTHROID/LEVOTHROID) 75 MCG tablet 30 tablet 3     Sig: Take 1 tablet (75 mcg) by mouth daily       Thyroid Protocol Passed - 3/25/2020  2:56 PM        Passed - Patient is 12 years or older        Passed - Recent (12 mo) or future (30 days) visit within the authorizing provider's specialty     Patient has had an office visit with the authorizing provider or a provider within the authorizing providers department within the previous 12 mos or has a future within next 30 days. See \"Patient Info\" tab in inbasket, or \"Choose Columns\" in Meds & Orders section of the refill encounter.              Passed - Medication is active on med list        Passed - Normal TSH on file in past 12 months     Recent Labs   Lab Test 11/12/19  0832   TSH 2.15              Passed - No active pregnancy on record     If patient is pregnant or has had a positive pregnancy test, please check TSH.          Passed - No positive pregnancy test in past 12 months     If patient is pregnant or has had a positive pregnancy test, please check TSH.             "

## 2020-05-15 ENCOUNTER — TELEPHONE (OUTPATIENT)
Dept: FAMILY MEDICINE | Facility: CLINIC | Age: 45
End: 2020-05-15

## 2020-05-15 RX ORDER — LEVOTHYROXINE SODIUM 75 UG/1
75 TABLET ORAL DAILY
Qty: 90 TABLET | Refills: 1 | Status: SHIPPED | OUTPATIENT
Start: 2020-05-15 | End: 2020-11-04

## 2020-05-15 NOTE — TELEPHONE ENCOUNTER
"LS    Patient called.  States she has very dry skin under her nose.  \"At first they look like tiny water blisters, then they pop and then there ends up being dry skin/peeling skin\"    States she has had this twice before and both times was given a steroid cream which cleared it up in about 1 week.  Last time 3 years ago and can't remember name of cream.    Patient currently in [place], [place] staying with her father.  Pharmacy: [facility] ([phone])    Do you want to do a telephone visit?  Please advise.  Thanks,  Charlette Nuñez, RN    "

## 2020-05-18 NOTE — TELEPHONE ENCOUNTER
Informed pt below.  She will call back if she wants to schedule- may schedule somewhere else instead.    Ana María Hope RN

## 2021-01-04 ENCOUNTER — HEALTH MAINTENANCE LETTER (OUTPATIENT)
Age: 46
End: 2021-01-04

## 2021-08-15 ENCOUNTER — HEALTH MAINTENANCE LETTER (OUTPATIENT)
Age: 46
End: 2021-08-15

## 2021-10-10 ENCOUNTER — HEALTH MAINTENANCE LETTER (OUTPATIENT)
Age: 46
End: 2021-10-10

## 2022-01-30 ENCOUNTER — HEALTH MAINTENANCE LETTER (OUTPATIENT)
Age: 47
End: 2022-01-30

## 2022-09-18 ENCOUNTER — HEALTH MAINTENANCE LETTER (OUTPATIENT)
Age: 47
End: 2022-09-18

## 2023-05-07 ENCOUNTER — HEALTH MAINTENANCE LETTER (OUTPATIENT)
Age: 48
End: 2023-05-07

## 2023-10-08 ENCOUNTER — HEALTH MAINTENANCE LETTER (OUTPATIENT)
Age: 48
End: 2023-10-08

## 2024-05-31 NOTE — TELEPHONE ENCOUNTER
Pt arrived back to the unit at about 10:00. Pt denies any nausea or dizziness. Complained of mild pain on her wrist and feels stiff on her body. Pt was given ginger ale and she took it. Pt declined her breakfast.   Vitals as follow: /53 P 55 O2 93%   Reason for call:  Patient reporting a symptom    Symptom or request: Pt called and is reporting that she has dry skin under her nose. She reports that it is flaky like dandruff and scaly as well.     Duration (how long have symptoms been present):   A month ago     Have you been treated for this before? Yes    Additional comments: Pt has been treated for something like this in the past but not recently     Phone Number patient can be reached at:  Cell number on file:    Telephone Information:   Mobile [phone]       Best Time:  Any    Can we leave a detailed message on this number:  YES    Call taken on 5/15/2020 at 11:48 AM by Rama Israel